# Patient Record
Sex: MALE | Race: ASIAN
[De-identification: names, ages, dates, MRNs, and addresses within clinical notes are randomized per-mention and may not be internally consistent; named-entity substitution may affect disease eponyms.]

---

## 2020-03-06 ENCOUNTER — APPOINTMENT (OUTPATIENT)
Dept: NEUROSURGERY | Facility: CLINIC | Age: 75
End: 2020-03-06
Payer: MEDICARE

## 2020-03-06 VITALS — HEIGHT: 63 IN

## 2020-03-06 DIAGNOSIS — M43.17 SPONDYLOLISTHESIS, LUMBOSACRAL REGION: ICD-10-CM

## 2020-03-06 DIAGNOSIS — M54.16 RADICULOPATHY, LUMBAR REGION: ICD-10-CM

## 2020-03-06 DIAGNOSIS — M51.26 OTHER INTERVERTEBRAL DISC DISPLACEMENT, LUMBAR REGION: ICD-10-CM

## 2020-03-06 PROBLEM — Z00.00 ENCOUNTER FOR PREVENTIVE HEALTH EXAMINATION: Status: ACTIVE | Noted: 2020-03-06

## 2020-03-06 PROCEDURE — 99204 OFFICE O/P NEW MOD 45 MIN: CPT

## 2022-03-09 NOTE — ASSESSMENT
[FreeTextEntry1] : 74 year old gentleman with acute onset of left L5 radiculopathy affecting severely his left lateral calf and dorsum of the foot along with paresthesia on Jan 17. He was treated with PT and Medrol dose iraj. Gradually he has improved nicely. Sitting was the only position from which he got some relief. Lying down was difficult for him as well. He had a small to moderate component of low back pain which has also dissipated. \par \par On exam, he has good ROM in the low back with flexion and extension without back pain. Negative SLRs bilaterally. Reflexes symmetric. Gait is steady and fluid. Motor and sensory intact. No tenderness to palpation in the lower back. \par \par I reviewed an MRI of the LS spine done recently- L5-S1 anterolisthesis due to L5 spondylolysis. Left L5-S1 herniated disc with upward migration causing severe L5 foraminal stenosis impinging upon the left L5 nerve root. \par \par In the future, if his sx returns, he may benefit from epidural injection by pain specialist, and if that fails we can consider decompressive foraminotomy with possible cortical screws stabilization. Flexion extension LS spine will be needed before surgical decompression. We are glad that he is quite symptoms free at this point. He understood our discussion well. yes